# Patient Record
(demographics unavailable — no encounter records)

---

## 2025-02-03 NOTE — CONSULT LETTER
[Dear  ___] : Dear  [unfilled], [Consult Letter:] : I had the pleasure of evaluating your patient, [unfilled]. [Please see my note below.] : Please see my note below. [Consult Closing:] : Thank you very much for allowing me to participate in the care of this patient.  If you have any questions, please do not hesitate to contact me. [Sincerely,] : Sincerely, [FreeTextEntry2] : Dr. Holcomb [FreeTextEntry3] : Nilda Eagle CPNP Nurse Practitioner Pediatric Hematology [facility] [email or web address]

## 2025-02-03 NOTE — PHYSICAL EXAM
[Normal] : affect appropriate [de-identified] : <2cm, soft, moveable lymph node palpated on right upper anterior cervical and left middle cerfical chain

## 2025-02-03 NOTE — HISTORY OF PRESENT ILLNESS
[de-identified] : Mirna is a 4y/o female, with no significant medical history, referred for evaluation of thrombocytopenia. BW on file resulted as follows:    6/1/24: Hgb 11.9, platelets 32K, ZAK negative   1/17/24: platelets 26K    8/7/23: platelets 66K   Mirna is doing well. Did have flu symptoms about two-weeks ago. No h/o bleeding from nose or mouth, or blood in urine or stool. Has occasional bruises after injury on her legs or arms after injuries - never greater than 2-inches - always predisposed by injury. No petechiae.   No known family history of low platelets or anemia.   She has a well-varied diet. She drinks 20-ounces of milk/day.

## 2025-02-03 NOTE — PHYSICAL EXAM
[Normal] : affect appropriate [de-identified] : <2cm, soft, moveable lymph node palpated on right upper anterior cervical and left middle cerfical chain

## 2025-02-03 NOTE — CONSULT LETTER
[Dear  ___] : Dear  [unfilled], [Consult Letter:] : I had the pleasure of evaluating your patient, [unfilled]. [Please see my note below.] : Please see my note below. [Consult Closing:] : Thank you very much for allowing me to participate in the care of this patient.  If you have any questions, please do not hesitate to contact me. [Sincerely,] : Sincerely, [FreeTextEntry2] : Dr. Holcomb [FreeTextEntry3] : Nilda Eagle CPNP Nurse Practitioner Pediatric Hematology [facility] [email or web address]

## 2025-02-03 NOTE — REASON FOR VISIT
[New Patient/Consultation] : a new patient/consultation for [Thrombocytopenia] : thrombocytopenia [Medical Records] : medical records [Father] : father [Interpreters_IDNumber] : [id number] [Interpreters_FullName] : Dusty [TWNoteComboBox1] : [place]

## 2025-02-03 NOTE — REASON FOR VISIT
[New Patient/Consultation] : a new patient/consultation for [Thrombocytopenia] : thrombocytopenia [Medical Records] : medical records [Father] : father [Interpreters_IDNumber] : [id number] [Interpreters_FullName] : Dusty [TWNoteComboBox1] : [place]

## 2025-02-03 NOTE — RESULTS/DATA
[FreeTextEntry1] : Peripheral blood smear reviewed with Dr. Delgado:   RBCs: Normal morphology.   WBCs: Normal morphology. No blasts visualized.   Platelets: Normal morphology.

## 2025-02-03 NOTE — HISTORY OF PRESENT ILLNESS
[de-identified] : Mirna is a 4y/o female, with no significant medical history, referred for evaluation of thrombocytopenia. BW on file resulted as follows:    6/1/24: Hgb 11.9, platelets 32K, ZAK negative   1/17/24: platelets 26K    8/7/23: platelets 66K   Mirna is doing well. Did have flu symptoms about two-weeks ago. No h/o bleeding from nose or mouth, or blood in urine or stool. Has occasional bruises after injury on her legs or arms after injuries - never greater than 2-inches - always predisposed by injury. No petechiae.   No known family history of low platelets or anemia.   She has a well-varied diet. She drinks 20-ounces of milk/day.